# Patient Record
Sex: FEMALE | Race: WHITE | URBAN - METROPOLITAN AREA
[De-identification: names, ages, dates, MRNs, and addresses within clinical notes are randomized per-mention and may not be internally consistent; named-entity substitution may affect disease eponyms.]

---

## 2021-03-01 ENCOUNTER — OFFICE VISIT (OUTPATIENT)
Dept: FAMILY MEDICINE CLINIC | Facility: CLINIC | Age: 61
End: 2021-03-01
Payer: MEDICAID

## 2021-03-01 VITALS
TEMPERATURE: 99 F | RESPIRATION RATE: 14 BRPM | WEIGHT: 158 LBS | BODY MASS INDEX: 26.33 KG/M2 | HEART RATE: 104 BPM | SYSTOLIC BLOOD PRESSURE: 130 MMHG | OXYGEN SATURATION: 94 % | HEIGHT: 65 IN | DIASTOLIC BLOOD PRESSURE: 70 MMHG

## 2021-03-01 DIAGNOSIS — G47.01 INSOMNIA DUE TO MEDICAL CONDITION: ICD-10-CM

## 2021-03-01 DIAGNOSIS — E78.2 MIXED HYPERLIPIDEMIA: ICD-10-CM

## 2021-03-01 DIAGNOSIS — E11.21 TYPE 2 DIABETES MELLITUS WITH DIABETIC NEPHROPATHY, WITHOUT LONG-TERM CURRENT USE OF INSULIN (HCC): ICD-10-CM

## 2021-03-01 DIAGNOSIS — F41.9 ANXIETY: ICD-10-CM

## 2021-03-01 DIAGNOSIS — G89.21 CHRONIC PAIN DUE TO TRAUMA: Primary | ICD-10-CM

## 2021-03-01 DIAGNOSIS — F43.12 CHRONIC POST-TRAUMATIC STRESS DISORDER (PTSD): ICD-10-CM

## 2021-03-01 PROCEDURE — 3008F BODY MASS INDEX DOCD: CPT | Performed by: FAMILY MEDICINE

## 2021-03-01 PROCEDURE — 3078F DIAST BP <80 MM HG: CPT | Performed by: FAMILY MEDICINE

## 2021-03-01 PROCEDURE — 3075F SYST BP GE 130 - 139MM HG: CPT | Performed by: FAMILY MEDICINE

## 2021-03-01 PROCEDURE — 99205 OFFICE O/P NEW HI 60 MIN: CPT | Performed by: FAMILY MEDICINE

## 2021-03-01 RX ORDER — ZOLPIDEM TARTRATE 12.5 MG/1
12.5 TABLET, FILM COATED, EXTENDED RELEASE ORAL NIGHTLY PRN
Qty: 30 TABLET | Refills: 2 | Status: SHIPPED | OUTPATIENT
Start: 2021-03-01 | End: 2021-06-03

## 2021-03-01 RX ORDER — FENOFIBRATE 160 MG/1
160 TABLET ORAL DAILY
Qty: 90 TABLET | Refills: 1 | Status: SHIPPED | OUTPATIENT
Start: 2021-03-01 | End: 2021-11-02

## 2021-03-01 RX ORDER — MIRTAZAPINE 30 MG/1
30 TABLET, FILM COATED ORAL NIGHTLY
COMMUNITY
Start: 2021-02-17 | End: 2021-03-01

## 2021-03-01 RX ORDER — MIRTAZAPINE 30 MG/1
30 TABLET, FILM COATED ORAL NIGHTLY
Qty: 90 TABLET | Refills: 1 | Status: SHIPPED | OUTPATIENT
Start: 2021-03-01 | End: 2021-09-21

## 2021-03-01 RX ORDER — FENOFIBRATE 160 MG/1
160 TABLET ORAL DAILY
COMMUNITY
Start: 2021-02-03 | End: 2021-03-01

## 2021-03-01 RX ORDER — NALOXONE HYDROCHLORIDE 4 MG/.1ML
4 SPRAY, METERED NASAL AS NEEDED
Qty: 1 KIT | Refills: 0 | Status: SHIPPED | OUTPATIENT
Start: 2021-03-01

## 2021-03-01 RX ORDER — ALPRAZOLAM 1 MG/1
1 TABLET ORAL 3 TIMES DAILY PRN
Qty: 90 TABLET | Refills: 1 | Status: SHIPPED | OUTPATIENT
Start: 2021-03-01 | End: 2021-04-14

## 2021-03-01 RX ORDER — ZOLPIDEM TARTRATE 10 MG/1
TABLET ORAL
COMMUNITY
Start: 2021-02-05 | End: 2021-03-01

## 2021-03-01 RX ORDER — SIMVASTATIN 20 MG
20 TABLET ORAL EVERY EVENING
Qty: 90 TABLET | Refills: 1 | Status: SHIPPED | OUTPATIENT
Start: 2021-03-01 | End: 2021-10-05

## 2021-03-01 RX ORDER — OXYCODONE HYDROCHLORIDE 15 MG/1
15 TABLET ORAL EVERY 6 HOURS
COMMUNITY
Start: 2021-02-19 | End: 2021-03-24

## 2021-03-01 RX ORDER — BLOOD-GLUCOSE METER
EACH MISCELLANEOUS
COMMUNITY
Start: 2021-02-03

## 2021-03-01 RX ORDER — BLOOD SUGAR DIAGNOSTIC
STRIP MISCELLANEOUS DAILY
COMMUNITY
Start: 2021-02-03

## 2021-03-01 RX ORDER — SIMVASTATIN 20 MG
20 TABLET ORAL EVERY EVENING
COMMUNITY
Start: 2021-02-03 | End: 2021-03-01

## 2021-03-01 RX ORDER — LANCETS 33 GAUGE
1 EACH MISCELLANEOUS DAILY
COMMUNITY
Start: 2021-02-03

## 2021-03-01 RX ORDER — MORPHINE SULFATE 30 MG/1
60 TABLET, FILM COATED, EXTENDED RELEASE ORAL EVERY 12 HOURS
COMMUNITY
Start: 2021-01-21 | End: 2021-03-01

## 2021-03-01 RX ORDER — ALPRAZOLAM 1 MG/1
TABLET ORAL
COMMUNITY
Start: 2021-02-03 | End: 2021-03-01

## 2021-03-01 RX ORDER — OXYCODONE HYDROCHLORIDE 15 MG/1
15 TABLET ORAL EVERY 6 HOURS PRN
Qty: 120 TABLET | Refills: 0 | Status: SHIPPED | OUTPATIENT
Start: 2021-03-01 | End: 2021-03-11

## 2021-03-01 NOTE — PROGRESS NOTES
Chief Complaint:  Patient presents with:  Establish Care: New Pt  Pain    HPI:  This is a 64year old female patient presenting for Establish Care (New Pt) and Pain    Patient presents today with her niece. They live 2 hours away currently.  She recently mo mirtazapine and xanax. Xanax is taken TID and has for years due to PTSD and anxiety. With the transition and living situation has needed a few extra this month. Denies SI/HI. As well, due to pain and anxiety, has insomnia. Currently on ambien CR 12.5mg.  Leidy Godinez DAILY     • ONETOUCH ULTRA In Vitro Strip by Other route daily. • Lancets (ONETOUCH DELICA PLUS [id number]) Does not apply Misc 1 lancet by Finger stick route daily. • OxyCODONE HCl IR 15 MG Oral Tab Take 15 mg by mouth every 6 (six) hours.      • Ox 03/01/21  1419   BP: 130/70   Pulse: 104   Resp: 14   Temp: 98.6 °F (37 °C)   TempSrc: Temporal   SpO2: 94%   Weight: 158 lb (71.7 kg)   Height: 5' 5\" (1.651 m)     GENERAL: vitals reviewed and listed above, alert, oriented, appears well hydrated and in n consistent with history. Patient will need chronic pain management. Recommend she is seen by someone local as 2 hours will be far to drive for monthly or even every 3 month refills.  Also, given large doses of pain meds, would recommend a specialist. Yissel Ponce (1,000 mg total) by mouth 2 (two) times daily with meals.  -Labs at next visit    RTC in 3 months.     Spent 67 minutes reviewing chart and records, obtaining history, evaluating patient, discussing treatment options, counseling on above and completing docu

## 2021-03-02 ENCOUNTER — TELEPHONE (OUTPATIENT)
Dept: FAMILY MEDICINE CLINIC | Facility: CLINIC | Age: 61
End: 2021-03-02

## 2021-03-02 PROBLEM — E11.21 TYPE 2 DIABETES MELLITUS WITH DIABETIC NEPHROPATHY, WITHOUT LONG-TERM CURRENT USE OF INSULIN (HCC): Status: ACTIVE | Noted: 2021-03-02

## 2021-03-02 PROBLEM — G89.21 CHRONIC PAIN DUE TO TRAUMA: Status: ACTIVE | Noted: 2021-03-02

## 2021-03-02 PROBLEM — G47.01 INSOMNIA DUE TO MEDICAL CONDITION: Status: ACTIVE | Noted: 2021-03-02

## 2021-03-02 PROBLEM — F41.9 ANXIETY: Status: ACTIVE | Noted: 2021-03-02

## 2021-03-02 PROBLEM — F43.12 CHRONIC POST-TRAUMATIC STRESS DISORDER (PTSD): Status: ACTIVE | Noted: 2021-03-02

## 2021-03-02 PROBLEM — E78.2 MIXED HYPERLIPIDEMIA: Status: ACTIVE | Noted: 2021-03-02

## 2021-03-02 RX ORDER — ZOLPIDEM TARTRATE 10 MG/1
TABLET ORAL
COMMUNITY
Start: 2021-03-01 | End: 2021-04-14 | Stop reason: DRUGHIGH

## 2021-03-02 NOTE — TELEPHONE ENCOUNTER
Kerri Hooks from [employer] states that pt will require a Prior Authorization on pt's medication OxyCODONE HCl IR 15 MG.  Fax to follow (help desk [phone])

## 2021-03-02 NOTE — TELEPHONE ENCOUNTER
Approved today  Details of this decision are provided on the physician outcome notice which has been faxed to the number on file.

## 2021-03-03 NOTE — TELEPHONE ENCOUNTER
Received a fax from [facility] regarding coverage of Oxycodone 15mgs which is now denied  Pt must see a pain specialist, Tarcie Figueredo specialist or Neurologist to treat her conditions   Tracking Number [id number]

## 2021-03-03 NOTE — TELEPHONE ENCOUNTER
LM for patient with the following information:  Received fax from [facility] regarding coverage of Oxycodone 15mgs whis is now denied patient must see a pain specialist, Pamela Figueredo specialist or Neurologist to treat her conditions

## 2021-03-08 ENCOUNTER — TELEPHONE (OUTPATIENT)
Dept: FAMILY MEDICINE CLINIC | Facility: CLINIC | Age: 61
End: 2021-03-08

## 2021-03-08 DIAGNOSIS — G89.21 CHRONIC PAIN DUE TO TRAUMA: ICD-10-CM

## 2021-03-08 NOTE — TELEPHONE ENCOUNTER
Pt requesting a refill on oxyCODONE. Advised pt to pharmacy pt states she called them and they old her they cannot send a refill request because medication has been discontinued. Pt states she has 1 week left of medication.

## 2021-03-08 NOTE — TELEPHONE ENCOUNTER
Per [place]  oxycodone was dispensed on 3/1/21 for #120    Left message on home number for patient to call back.

## 2021-03-10 ENCOUNTER — TELEPHONE (OUTPATIENT)
Dept: FAMILY MEDICINE CLINIC | Facility: CLINIC | Age: 61
End: 2021-03-10

## 2021-03-10 DIAGNOSIS — G89.21 CHRONIC PAIN DUE TO TRAUMA: Primary | ICD-10-CM

## 2021-03-10 NOTE — TELEPHONE ENCOUNTER
Dr. Ashley Huggins, could you please finish your office visit note from 3/1/2021 for this patient. She has found a pain management doctor that will take her insurance and I need to fax over her last office visit notes. Thank you.

## 2021-03-10 NOTE — TELEPHONE ENCOUNTER
Patient is needing a referral to pain specialist, she spoke with her insurance and they had given her the name and number of Dr. Amanda Lazar, [phone]

## 2021-03-10 NOTE — TELEPHONE ENCOUNTER
Called pharmacy and on 3/1/21 she picked up 60 tablets not 120 as the pharmacy only had 60 tablets. So she will need refill sent as she only got 15 days of medication.  She will need new prescription sent as the other one was used up even though they only g

## 2021-03-11 RX ORDER — OXYCODONE HYDROCHLORIDE 15 MG/1
15 TABLET ORAL EVERY 6 HOURS PRN
Qty: 60 TABLET | Refills: 0 | Status: SHIPPED | OUTPATIENT
Start: 2021-03-11 | End: 2021-05-13 | Stop reason: ALTCHOICE

## 2021-03-11 NOTE — TELEPHONE ENCOUNTER
I sent in #60 (although I am confused why they need a new script if they gave a partial fill)    Carlos Goes, DO

## 2021-03-11 NOTE — TELEPHONE ENCOUNTER
Referral request Dr. Margot Nails M.D.  [address]    Fax number: [phone]    Diagnosis: Chronic pain due to trauma

## 2021-03-16 ENCOUNTER — TELEPHONE (OUTPATIENT)
Dept: FAMILY MEDICINE CLINIC | Facility: CLINIC | Age: 61
End: 2021-03-16

## 2021-03-16 NOTE — TELEPHONE ENCOUNTER
Patient completed medical records release form to obtain records from previous provider Zaire Mcelroy MD. Release faxed to Lu Saavedra.  Akanksha Mcelroy MD at [phone]

## 2021-03-16 NOTE — TELEPHONE ENCOUNTER
Patient completed medical records release form to obtain records from previous provider, [facility] Department.  Release faxed to [facility] at [phone]

## 2021-03-22 DIAGNOSIS — G89.21 CHRONIC PAIN DUE TO TRAUMA: Primary | ICD-10-CM

## 2021-03-22 NOTE — TELEPHONE ENCOUNTER
Patient needs her Furosemide and Mirtazapine 30 mg for post traumatic stress  refilled to [address] [place] [address]. She is out of the medication.

## 2021-03-22 NOTE — TELEPHONE ENCOUNTER
Rx for Mirtazapine was [facility]1/2021 for #90 with one refill. We do not have Furosemide listed in her current medications and I do not see it in her medication history. What does she take Furosemide for and what is sig?     LOV 3/1/2021 was

## 2021-03-24 RX ORDER — FUROSEMIDE 20 MG/1
20 TABLET ORAL DAILY
Refills: 0 | COMMUNITY
Start: 2021-03-24 | End: 2021-03-26

## 2021-03-24 NOTE — TELEPHONE ENCOUNTER
called and talked to patient and confirmed that she is taking furosemide 20 mg every am for swelling in her ankles. Entered this in her formulary.  She also said she cannot see the pain clinic until she can have her records sent from [place], she is Obi Lizarraga

## 2021-03-25 RX ORDER — OXYCODONE HYDROCHLORIDE 15 MG/1
15 TABLET ORAL EVERY 6 HOURS
Qty: 60 TABLET | Refills: 0 | Status: SHIPPED | OUTPATIENT
Start: 2021-03-25 | End: 2021-05-13 | Stop reason: ALTCHOICE

## 2021-03-25 NOTE — TELEPHONE ENCOUNTER
I sent in the script for oxycodone. Please note that she needs to get in to see the pain management specialist- I do not plan on managing her pain long term given she is complex case.  Also, according to previous notes, her insurance will not cover unless i

## 2021-03-26 NOTE — TELEPHONE ENCOUNTER
Pt is calling and [facility] said they never received the script for her Furosemide. Please send again.

## 2021-03-28 RX ORDER — FUROSEMIDE 20 MG/1
20 TABLET ORAL DAILY
Qty: 90 TABLET | Refills: 0 | Status: SHIPPED | OUTPATIENT
Start: 2021-03-28 | End: 2021-07-09

## 2021-04-14 ENCOUNTER — OFFICE VISIT (OUTPATIENT)
Dept: FAMILY MEDICINE CLINIC | Facility: CLINIC | Age: 61
End: 2021-04-14
Payer: MEDICAID

## 2021-04-14 ENCOUNTER — TELEPHONE (OUTPATIENT)
Dept: FAMILY MEDICINE CLINIC | Facility: CLINIC | Age: 61
End: 2021-04-14

## 2021-04-14 VITALS
WEIGHT: 166 LBS | OXYGEN SATURATION: 98 % | SYSTOLIC BLOOD PRESSURE: 130 MMHG | RESPIRATION RATE: 14 BRPM | DIASTOLIC BLOOD PRESSURE: 70 MMHG | HEIGHT: 65 IN | BODY MASS INDEX: 27.66 KG/M2 | TEMPERATURE: 98 F | HEART RATE: 98 BPM

## 2021-04-14 DIAGNOSIS — E11.21 TYPE 2 DIABETES MELLITUS WITH DIABETIC NEPHROPATHY, WITHOUT LONG-TERM CURRENT USE OF INSULIN (HCC): ICD-10-CM

## 2021-04-14 DIAGNOSIS — F43.12 CHRONIC POST-TRAUMATIC STRESS DISORDER (PTSD): ICD-10-CM

## 2021-04-14 DIAGNOSIS — F41.9 ANXIETY: ICD-10-CM

## 2021-04-14 DIAGNOSIS — G89.21 CHRONIC PAIN DUE TO TRAUMA: Primary | ICD-10-CM

## 2021-04-14 DIAGNOSIS — G47.01 INSOMNIA DUE TO MEDICAL CONDITION: ICD-10-CM

## 2021-04-14 DIAGNOSIS — E78.2 MIXED HYPERLIPIDEMIA: ICD-10-CM

## 2021-04-14 PROCEDURE — 3075F SYST BP GE 130 - 139MM HG: CPT | Performed by: FAMILY MEDICINE

## 2021-04-14 PROCEDURE — 3008F BODY MASS INDEX DOCD: CPT | Performed by: FAMILY MEDICINE

## 2021-04-14 PROCEDURE — 3078F DIAST BP <80 MM HG: CPT | Performed by: FAMILY MEDICINE

## 2021-04-14 PROCEDURE — 99214 OFFICE O/P EST MOD 30 MIN: CPT | Performed by: FAMILY MEDICINE

## 2021-04-14 RX ORDER — OXYCODONE HYDROCHLORIDE 15 MG/1
15 TABLET ORAL EVERY 6 HOURS PRN
Qty: 60 TABLET | Refills: 0 | Status: SHIPPED | OUTPATIENT
Start: 2021-04-14 | End: 2021-04-30

## 2021-04-14 RX ORDER — ALPRAZOLAM 1 MG/1
1 TABLET ORAL 3 TIMES DAILY PRN
Qty: 90 TABLET | Refills: 2 | Status: SHIPPED | OUTPATIENT
Start: 2021-04-14 | End: 2021-07-06

## 2021-04-14 NOTE — PROGRESS NOTES
Chief Complaint:  Patient presents with:  Pain: Follow Up on pain medication, hasnt been able to make an appt with pain speacialist until they recieve her old medical records   Sleep Problem: Insurance wont cover Zolpidem  Anxiety: Requesting refills on Al history on file.    Past Surgical History:   Procedure Laterality Date   • APPENDECTOMY     • FACIAL PROSTHESIS     • FOOT SURGERY     • HIP REPLACEMENT SURGERY     • HYSTERECTOMY        Family History   Problem Relation Age of Onset   • Diabetes Mother Oral Tab CR Take 1 tablet (12.5 mg total) by mouth nightly as needed for Sleep. 30 tablet 2   • mirtazapine 30 MG Oral Tab Take 1 tablet (30 mg total) by mouth nightly.  90 tablet 1   • Fenofibrate 160 MG Oral Tab Take 1 tablet (160 mg total) by mouth daily Insomnia due to medical condition       Mental Health    Chronic post-traumatic stress disorder (PTSD)    Relevant Medications    ALPRAZolam 1 MG Oral Tab       Other    Anxiety    Relevant Medications    ALPRAZolam 1 MG Oral Tab          Advised the jose davido

## 2021-04-14 NOTE — TELEPHONE ENCOUNTER
Received letter from [facility] a notification regarding coverage of Zolpidem ER 12.5mgs coverage was granted only temporary fill for 30 days, now PA is required

## 2021-04-15 NOTE — TELEPHONE ENCOUNTER
Received faxed response from [facility] regarding coverage of Zolpidem tartrate 12.5mgs, coverage granted effective 3/1/2021 and ends 4/14/2022  Case # [id number]

## 2021-04-26 ENCOUNTER — TELEPHONE (OUTPATIENT)
Dept: FAMILY MEDICINE CLINIC | Facility: CLINIC | Age: 61
End: 2021-04-26

## 2021-04-26 DIAGNOSIS — G89.21 CHRONIC PAIN DUE TO TRAUMA: ICD-10-CM

## 2021-04-26 NOTE — TELEPHONE ENCOUNTER
Pt is calling she said she went to the pain clinic that Dr. Tristan Kyle had referred her to and they said her situation was too complicated and to contact Dr. Tristan Kyle back. They will not see her.  Please call

## 2021-04-27 NOTE — TELEPHONE ENCOUNTER
Can we get the pain management contact information? I am not sure why they cannot prescribe her chronic pain medications that she has been stable on for a long time. I manage all her other medications. I plan to discuss with their office.     Mckenzie La,

## 2021-04-30 RX ORDER — OXYCODONE HYDROCHLORIDE 15 MG/1
15 TABLET ORAL EVERY 6 HOURS PRN
Qty: 60 TABLET | Refills: 0 | Status: SHIPPED | OUTPATIENT
Start: 2021-04-30 | End: 2021-05-13 | Stop reason: ALTCHOICE

## 2021-04-30 NOTE — TELEPHONE ENCOUNTER
I will be reaching out to the pain management specialist that she saw to discuss. I do not intend to be prescribing this long term. I would recommend that she look into alternative pain management specialists. I will provide a short term refill.   Kelley Raya

## 2021-05-01 NOTE — TELEPHONE ENCOUNTER
several pain management that takes her insurance  [facility] [address]  Dr Charla Andersen  [phone]  Dr Leon Payment [phone]  Darlene Everett [address] [facility] [address] [facility]

## 2021-05-07 NOTE — TELEPHONE ENCOUNTER
Patient called back and I went over her the names of some of the pain med doctors who take her insurance we have been calling the wrong phone number so I corrected the number in her chart

## 2021-05-10 ENCOUNTER — TELEPHONE (OUTPATIENT)
Dept: FAMILY MEDICINE CLINIC | Facility: CLINIC | Age: 61
End: 2021-05-10

## 2021-05-10 DIAGNOSIS — F43.12 PROLONGED POSTTRAUMATIC STRESS DISORDER: ICD-10-CM

## 2021-05-10 DIAGNOSIS — G89.21 CHRONIC PAIN DUE TO TRAUMA: Primary | ICD-10-CM

## 2021-05-10 NOTE — TELEPHONE ENCOUNTER
Pt states she does not need referral for [facility]. The doctor there told her that she is too complicated and refused to see her. Pt states she is still looking for a pain clinic and will call back once she finds one.

## 2021-05-10 NOTE — TELEPHONE ENCOUNTER
Pt calling with Information on who she can see per her Insurance.  [place] [address]  Ph # [phone] and fax # [phone]

## 2021-05-11 NOTE — TELEPHONE ENCOUNTER
Referral request [address] [facility] [place] [address]  [address] [facility],[address]  [facility] [place]  Phone number: [phone]  Fax number: [phone]    Office notes from Dr. Angus Delgado DO 3/1/2021  Saw a physician in her taken TID and has for years due to PTSD and anxiety. With the transition and living situation has needed a few extra this month. Denies SI/HI. As well, due to pain and anxiety, has insomnia. Currently on ambien CR 12.5mg.  This works but is not perfect at

## 2021-05-12 DIAGNOSIS — G89.21 CHRONIC PAIN DUE TO TRAUMA: ICD-10-CM

## 2021-05-12 NOTE — TELEPHONE ENCOUNTER
Pt states she will be out of OxyCODONE on 05/15/21. Advised pt to reach out to her pharmacy for the refill. Pt states she never has to call her pharmacy she just calls her our office.

## 2021-05-13 RX ORDER — OXYCODONE HYDROCHLORIDE 15 MG/1
15 TABLET ORAL EVERY 6 HOURS PRN
Qty: 60 TABLET | Refills: 0 | Status: SHIPPED | OUTPATIENT
Start: 2021-05-13 | End: 2021-05-24

## 2021-05-13 NOTE — TELEPHONE ENCOUNTER
Will refill again. Needs appt in office for next refill if not yet established with pain management. Do we know why pain management is not accepting her as a patient?     Krissy Lopez, DO

## 2021-05-13 NOTE — TELEPHONE ENCOUNTER
Called and talked to patient she is trying to get in with pain management but waiting for her records to be sent.

## 2021-05-24 RX ORDER — OXYCODONE HYDROCHLORIDE 15 MG/1
15 TABLET ORAL EVERY 6 HOURS PRN
Qty: 60 TABLET | Refills: 0 | Status: SHIPPED | OUTPATIENT
Start: 2021-05-24 | End: 2021-06-03

## 2021-05-24 NOTE — TELEPHONE ENCOUNTER
Pt just got a call from transportation and they can not bring her to her appointment tomorrow with Dr. Alejandra Davis at 11:20 am and her sister is going out of town until 6/3/21 she can come at 1:40 pm with Dr. Alejandra Davis until then she will be out of pain medication

## 2021-05-24 NOTE — TELEPHONE ENCOUNTER
Pt has no ride for tomorrow's appt. She said that she cannot do a video visit.     Future Appointments   Date Time Provider Lindsay Chang   6/3/2021  1:40 PM Kelley Menendez, DO [facility]   7/6/2021 11:00 AM Kelley Menendez, DO [facility]

## 2021-05-30 DIAGNOSIS — G47.01 INSOMNIA DUE TO MEDICAL CONDITION: ICD-10-CM

## 2021-06-01 NOTE — TELEPHONE ENCOUNTER
Refill request for:    Requested Prescriptions     Pending Prescriptions Disp Refills   • ZOLPIDEM TARTRATE ER 12.5 MG Oral Tab CR [Pharmacy Med Name: Zolpidem Tartrate ER 12.5 MG Oral Tablet Extended Release] 30 tablet 0     Sig: TAKE 1 TABLET BY MOUTH IN

## 2021-06-03 ENCOUNTER — OFFICE VISIT (OUTPATIENT)
Dept: FAMILY MEDICINE CLINIC | Facility: CLINIC | Age: 61
End: 2021-06-03
Payer: MEDICAID

## 2021-06-03 ENCOUNTER — TELEPHONE (OUTPATIENT)
Dept: FAMILY MEDICINE CLINIC | Facility: CLINIC | Age: 61
End: 2021-06-03

## 2021-06-03 VITALS
SYSTOLIC BLOOD PRESSURE: 126 MMHG | OXYGEN SATURATION: 99 % | DIASTOLIC BLOOD PRESSURE: 74 MMHG | RESPIRATION RATE: 14 BRPM | HEART RATE: 80 BPM | TEMPERATURE: 98 F | WEIGHT: 163 LBS | BODY MASS INDEX: 27.16 KG/M2 | HEIGHT: 65 IN

## 2021-06-03 DIAGNOSIS — Z00.00 ROUTINE HEALTH MAINTENANCE: ICD-10-CM

## 2021-06-03 DIAGNOSIS — G89.21 CHRONIC PAIN DUE TO TRAUMA: ICD-10-CM

## 2021-06-03 DIAGNOSIS — J44.9 CHRONIC OBSTRUCTIVE PULMONARY DISEASE, UNSPECIFIED COPD TYPE (HCC): Primary | ICD-10-CM

## 2021-06-03 DIAGNOSIS — E78.2 MIXED HYPERLIPIDEMIA: ICD-10-CM

## 2021-06-03 DIAGNOSIS — R01.1 HEART MURMUR: ICD-10-CM

## 2021-06-03 DIAGNOSIS — G47.01 INSOMNIA DUE TO MEDICAL CONDITION: ICD-10-CM

## 2021-06-03 DIAGNOSIS — R06.00 DYSPNEA ON EXERTION: ICD-10-CM

## 2021-06-03 DIAGNOSIS — E11.21 TYPE 2 DIABETES MELLITUS WITH DIABETIC NEPHROPATHY, WITHOUT LONG-TERM CURRENT USE OF INSULIN (HCC): ICD-10-CM

## 2021-06-03 DIAGNOSIS — G43.709 CHRONIC MIGRAINE WITHOUT AURA WITHOUT STATUS MIGRAINOSUS, NOT INTRACTABLE: ICD-10-CM

## 2021-06-03 PROCEDURE — 3074F SYST BP LT 130 MM HG: CPT | Performed by: FAMILY MEDICINE

## 2021-06-03 PROCEDURE — 99214 OFFICE O/P EST MOD 30 MIN: CPT | Performed by: FAMILY MEDICINE

## 2021-06-03 PROCEDURE — 3078F DIAST BP <80 MM HG: CPT | Performed by: FAMILY MEDICINE

## 2021-06-03 PROCEDURE — 3008F BODY MASS INDEX DOCD: CPT | Performed by: FAMILY MEDICINE

## 2021-06-03 RX ORDER — PROMETHAZINE HYDROCHLORIDE 25 MG/1
25 TABLET ORAL EVERY 8 HOURS PRN
Qty: 30 TABLET | Refills: 1 | Status: SHIPPED | OUTPATIENT
Start: 2021-06-03 | End: 2021-07-06

## 2021-06-03 RX ORDER — OXYCODONE HYDROCHLORIDE 15 MG/1
15 TABLET ORAL EVERY 6 HOURS PRN
Qty: 120 TABLET | Refills: 0 | Status: SHIPPED | OUTPATIENT
Start: 2021-06-03 | End: 2021-06-15

## 2021-06-03 RX ORDER — TIOTROPIUM BROMIDE 18 UG/1
18 CAPSULE ORAL; RESPIRATORY (INHALATION) DAILY
Qty: 90 CAPSULE | Refills: 3 | Status: SHIPPED | OUTPATIENT
Start: 2021-06-03 | End: 2021-11-02

## 2021-06-03 RX ORDER — ZOLMITRIPTAN 5 MG/1
5 TABLET, FILM COATED ORAL AS NEEDED
Qty: 12 TABLET | Refills: 1 | Status: SHIPPED | OUTPATIENT
Start: 2021-06-03 | End: 2021-06-11

## 2021-06-03 RX ORDER — ALBUTEROL SULFATE 90 UG/1
2 AEROSOL, METERED RESPIRATORY (INHALATION) EVERY 6 HOURS PRN
Qty: 18 G | Refills: 5 | Status: SHIPPED | OUTPATIENT
Start: 2021-06-03

## 2021-06-03 RX ORDER — PROMETHAZINE HYDROCHLORIDE 25 MG/1
25 TABLET ORAL EVERY 6 HOURS PRN
COMMUNITY
End: 2022-02-01

## 2021-06-03 RX ORDER — ZOLPIDEM TARTRATE 12.5 MG/1
12.5 TABLET, FILM COATED, EXTENDED RELEASE ORAL NIGHTLY PRN
Qty: 30 TABLET | Refills: 2 | Status: SHIPPED | OUTPATIENT
Start: 2021-06-03 | End: 2021-09-03

## 2021-06-03 NOTE — PROGRESS NOTES
Chief Complaint:  Patient presents with:  Medication Follow-Up: Follow Up Oxycodone   Forms Completion: Parking Placard   Diabetes: Has an appt with eye Dr. Austin and questioning which lab she should go to      HPI:  This is a 64year old female patient pre Review of systems performed and negative unless stated in HPI. Past medical, family and social history reviewed and listed as below. HISTORY:  History reviewed. No pertinent past medical history.    Past Surgical History:   Procedure Laterality Date daily with breakfast. 90 tablet 1   • furosemide (LASIX) 20 MG Oral Tab Take 1 tablet (20 mg total) by mouth daily.  90 tablet 0   • Blood Glucose Monitoring Suppl (ONETOUCH ULTRA 2) w/Device Does not apply Kit USE AS DIRECTED TESTING ONCE DAILY     • ONETO EXTREMITIES: warm and well perfused  PSYCH: pleasant and cooperative, flattened affect, tearful    ASSESSMENT AND PLAN:  Discussed the following assessment   Problem List Items Addressed This Visit        Cardiovascular    Mixed hyperlipidemia    Relevan (90 Base) MCG/ACT Inhalation Aero Soln; Inhale 2 puffs into the lungs every 6 (six) hours as needed for Wheezing (cough). -Encouraged to quit smoking. Patient not interested at this time. -     Will obtain COMPLETE PFT;  Future  -    STart Tiotropium Brom 25-HYDROXY    Type 2 diabetes mellitus with diabetic nephropathy, without long-term current use of insulin ([facility] [address]  Update labs. To send us eye exam info.    -     HEMOGLOBIN A1C  -     MICROALB/CREAT RATIO, RANDOM URINE    Mixed hyperlipidemia  -    recheck

## 2021-06-03 NOTE — TELEPHONE ENCOUNTER
Pharmacy faxed over prior-authorization for Zolpidem Tartrate ER (AMBIEN CR) 12.5 MG Oral Tab CR     Key  [id number]  Paperwork in Pat's office

## 2021-06-05 RX ORDER — ZOLPIDEM TARTRATE 12.5 MG/1
TABLET, FILM COATED, EXTENDED RELEASE ORAL
Qty: 30 TABLET | Refills: 0 | OUTPATIENT
Start: 2021-06-05

## 2021-06-07 ENCOUNTER — TELEPHONE (OUTPATIENT)
Dept: FAMILY MEDICINE CLINIC | Facility: CLINIC | Age: 61
End: 2021-06-07

## 2021-06-07 NOTE — TELEPHONE ENCOUNTER
Pt stating she is wanting her Zolmitriptan 5 mg for her migraines. She said the pharmacy is not refilling it. She said she can't get the remainder from the pharmacy at [facility] in [facility]. Please call to verify.  Pt is completely out of it and has been f

## 2021-06-07 NOTE — TELEPHONE ENCOUNTER
Pt said they refilled the Zolpidem it is the Zolmitriptan 5 mg that his needed. I requested it in another encounter.

## 2021-06-07 NOTE — TELEPHONE ENCOUNTER
I did not received the paperwork on this PA  And I do not know the pharmacy  Previously completed PA completed on 04/14/2021    Received faxed response from [facility] regarding coverage of Zolpidem tartrate 12.5mgs, coverage granted effective

## 2021-06-10 NOTE — TELEPHONE ENCOUNTER
Received faxed response to coverage of Zolmitriptan 5mgs - coverage denied  Must have tried and documented failure of at least 4 the following meds    Ibuprofen 400, 600, 800mg tabs  Naproxen tabs  Rizatriptan tabs or ODT  Sumatriptan tabs, nasal spray, au

## 2021-06-10 NOTE — TELEPHONE ENCOUNTER
Let's change to rizatriptan if patient is ok with this. 10mg, #12, TAke 1 tab po prn migraine, repeat in 2 hours if needed. No more than 2 doses in 24 hours.     Mikey Kaplan, DO

## 2021-06-11 RX ORDER — RIZATRIPTAN BENZOATE 10 MG/1
10 TABLET ORAL AS NEEDED
Qty: 12 TABLET | Refills: 1 | Status: SHIPPED | OUTPATIENT
Start: 2021-06-11 | End: 2021-12-29

## 2021-06-14 LAB — AMB EXT CREATININE: 1.52 MG/DL

## 2021-06-15 ENCOUNTER — TELEPHONE (OUTPATIENT)
Dept: FAMILY MEDICINE CLINIC | Facility: CLINIC | Age: 61
End: 2021-06-15

## 2021-06-15 DIAGNOSIS — G89.21 CHRONIC PAIN DUE TO TRAUMA: ICD-10-CM

## 2021-06-15 NOTE — TELEPHONE ENCOUNTER
PA request for Oxycodone. Questions answered by phone with Clinical Review dept. Chart notes requested. Faxed OV note from 3/1/21.

## 2021-06-15 NOTE — TELEPHONE ENCOUNTER
Patient requesting balance of script (#59).      Routed to Dr. Oneyda Soliz for review    Would like sent to [address] [facility] in [facility]

## 2021-06-15 NOTE — TELEPHONE ENCOUNTER
Received fax from [address] with patient's test results, placed in Dr. Maria Elena Krishnamurthy in box

## 2021-06-15 NOTE — TELEPHONE ENCOUNTER
Called pharmacy - they stated they only had 61 tabs in stock and patient opted to take what they had instead of waiting for remaining quantity to become available. Left message on home number for patient to call back.

## 2021-06-15 NOTE — TELEPHONE ENCOUNTER
Pt states she got her medication and the pharmacy only gave her 61 tablets out of 120. Pt states she was told that the the 120 tablets was denied by Dr. Parvez Boss. Pt states she will be out of medication Saturday 06/19/21.

## 2021-06-16 RX ORDER — OXYCODONE HYDROCHLORIDE 15 MG/1
15 TABLET ORAL EVERY 6 HOURS PRN
Qty: 59 TABLET | Refills: 0 | Status: SHIPPED | OUTPATIENT
Start: 2021-06-16 | End: 2021-07-01

## 2021-06-16 NOTE — TELEPHONE ENCOUNTER
Received faxed response from [address] # [id number]  Oxycodone 15mig tabs coverage partial approval  3/16/21 to 7/16/2021    In order to get further refill approved the must be documentation that the benzodiazepine h

## 2021-06-24 ENCOUNTER — TELEPHONE (OUTPATIENT)
Dept: FAMILY MEDICINE CLINIC | Facility: CLINIC | Age: 61
End: 2021-06-24

## 2021-06-24 NOTE — TELEPHONE ENCOUNTER
Patient completed medical records request form to obtain records from previous provider Suzanne Goncalves MD. Release faxed to Suzanne Goncalves MD at [phone].  This is the 2nd request being faxed as we never received records after 1st request that was sent

## 2021-06-29 ENCOUNTER — TELEPHONE (OUTPATIENT)
Dept: FAMILY MEDICINE CLINIC | Facility: CLINIC | Age: 61
End: 2021-06-29

## 2021-06-29 DIAGNOSIS — G89.21 CHRONIC PAIN DUE TO TRAUMA: ICD-10-CM

## 2021-06-29 NOTE — TELEPHONE ENCOUNTER
Pt requesting a referral for [facility] to take test for her lungs.  Pt states she was told that she her test results was received from [facility] on 06/15/21which is impossible because she has never been there before and if we received any

## 2021-06-30 DIAGNOSIS — G89.21 CHRONIC PAIN DUE TO TRAUMA: ICD-10-CM

## 2021-06-30 RX ORDER — OXYCODONE HYDROCHLORIDE 15 MG/1
15 TABLET ORAL EVERY 6 HOURS PRN
Qty: 59 TABLET | Refills: 0 | Status: CANCELLED | OUTPATIENT
Start: 2021-06-30

## 2021-06-30 NOTE — TELEPHONE ENCOUNTER
Dr. Alejandra Davis, could you please give us some insight into this situation so we can call the pt back and help her? This is very confusing.

## 2021-06-30 NOTE — TELEPHONE ENCOUNTER
Patient requesting refill on OxyContin IR 15 mg    Patient states she has Oxycodone has 15 pills left. But these would only last her until Saturday.        LOV 6/03/21 [place]    oxycontin IR 15 mg, one tab every 6 hrs PRN pain , 6/16/21 #59 + Gustavo Bautista

## 2021-06-30 NOTE — TELEPHONE ENCOUNTER
Medical clinic in [place], [facility] [address], [address] in: [facility]  Address: [address], [place], [address] [facility]  Hours:   Open ? Closes 5PM  Phone: [phone]    Fax # [phone]    Pulmonary Dept.

## 2021-06-30 NOTE — TELEPHONE ENCOUNTER
Pt requesting refill of Oxycodone. States she has not been able to see pain specialist yet as she is awaiting for medical records from previous provider.

## 2021-07-01 RX ORDER — OXYCODONE HYDROCHLORIDE 15 MG/1
15 TABLET ORAL EVERY 6 HOURS PRN
Qty: 120 TABLET | Refills: 0 | Status: SHIPPED | OUTPATIENT
Start: 2021-07-01 | End: 2021-08-02

## 2021-07-01 NOTE — TELEPHONE ENCOUNTER
PAtient is to have a TTE (echo) and PFTs. She wanted to do them locally so I think she was going to take the order to her local medical center to have completed and they would send us the results. We can fax the order to them if that is what is needed.

## 2021-07-01 NOTE — TELEPHONE ENCOUNTER
(No rush)    Can I have the name and clinic info of where she was seen in [place] again?     Thanks,  Nahum

## 2021-07-01 NOTE — TELEPHONE ENCOUNTER
Orders faxed to [address]. Patient notified. She states pain management won't see her because they haven't received her records from [place] yet. She requested the records last month.      Patient states she is almost out of Oxycodone and nee

## 2021-07-02 DIAGNOSIS — F41.9 ANXIETY: ICD-10-CM

## 2021-07-02 RX ORDER — ALPRAZOLAM 1 MG/1
1 TABLET ORAL 3 TIMES DAILY PRN
Qty: 90 TABLET | Refills: 2 | OUTPATIENT
Start: 2021-07-02

## 2021-07-02 NOTE — TELEPHONE ENCOUNTER
Refill request for:    Requested Prescriptions     Pending Prescriptions Disp Refills   • ALPRAZolam 1 MG Oral Tab 90 tablet 2     Sig: Take 1 tablet (1 mg total) by mouth 3 (three) times daily as needed for Anxiety.         Last Prescribed Quantity Refills

## 2021-07-02 NOTE — TELEPHONE ENCOUNTER
FYI- I called and left message with medical records stating that it was very important we/pain management received these records asap.   Earl Benavidez DO

## 2021-07-06 ENCOUNTER — TELEMEDICINE (OUTPATIENT)
Dept: FAMILY MEDICINE CLINIC | Facility: CLINIC | Age: 61
End: 2021-07-06

## 2021-07-06 DIAGNOSIS — E11.21 TYPE 2 DIABETES MELLITUS WITH DIABETIC NEPHROPATHY, WITHOUT LONG-TERM CURRENT USE OF INSULIN (HCC): ICD-10-CM

## 2021-07-06 DIAGNOSIS — J44.9 CHRONIC OBSTRUCTIVE PULMONARY DISEASE, UNSPECIFIED COPD TYPE (HCC): ICD-10-CM

## 2021-07-06 DIAGNOSIS — G89.21 CHRONIC PAIN DUE TO TRAUMA: Primary | ICD-10-CM

## 2021-07-06 DIAGNOSIS — R01.1 HEART MURMUR: ICD-10-CM

## 2021-07-06 DIAGNOSIS — F41.9 ANXIETY: ICD-10-CM

## 2021-07-06 DIAGNOSIS — E78.2 MIXED HYPERLIPIDEMIA: ICD-10-CM

## 2021-07-06 DIAGNOSIS — R06.00 DYSPNEA ON EXERTION: ICD-10-CM

## 2021-07-06 DIAGNOSIS — G43.709 CHRONIC MIGRAINE WITHOUT AURA WITHOUT STATUS MIGRAINOSUS, NOT INTRACTABLE: ICD-10-CM

## 2021-07-06 DIAGNOSIS — F43.12 CHRONIC POST-TRAUMATIC STRESS DISORDER (PTSD): ICD-10-CM

## 2021-07-06 PROBLEM — R06.09 DYSPNEA ON EXERTION: Status: ACTIVE | Noted: 2021-07-06

## 2021-07-06 PROCEDURE — 99214 OFFICE O/P EST MOD 30 MIN: CPT | Performed by: FAMILY MEDICINE

## 2021-07-06 RX ORDER — ALPRAZOLAM 1 MG/1
1 TABLET ORAL 3 TIMES DAILY PRN
Qty: 90 TABLET | Refills: 2 | Status: SHIPPED | OUTPATIENT
Start: 2021-08-01 | End: 2021-10-26

## 2021-07-06 NOTE — PROGRESS NOTES
VIDEO VISIT  This visit is conducted using Telemedicine with live, interactive video and audio. Patient has been referred to the [facility] website at [email or web address] to review the yearly Consent to Treat document.     Patient understands and accepts Microalbumin : due               On statin yes, ACE-i no, ASA no               Current therapy: metformin 1000mg once daily  Osteoporosis: Not currently taking medications. OA: Pain management as above. HLD: On simvastatin and fenofibrate.     [facility] Main needed for Migraine (repeat in 2 hours if not better MAX 2 in 24 hours. ). 12 tablet 1   • Promethazine HCl 25 MG Oral Tab Take 25 mg by mouth every 6 (six) hours as needed for Nausea.      • Zolpidem Tartrate ER (AMBIEN CR) 12.5 MG Oral Tab CR Take 1 tablet takes PO ok  Nsaids                  HIVES, SWELLING  Penicillins             HIVES  Tetracycline            HIVES, NAUSEA AND VOMITING  Adhesive Tape           OTHER (SEE COMMENTS)    Comment:Contact Dermatitis, Paper Tape and Coban okay  Fosamax Aura [facility])  To obtain PFTs. Encouraged starting spiriva.  Cont albuterol prn    Heart murmur  To obtain TTE    Dyspnea on exertion  Work up as above    Chronic migraine without aura without status migrainosus, not intractable  Cont meds as needed    Mixed hyperl

## 2021-07-09 DIAGNOSIS — R06.00 DYSPNEA ON EXERTION: ICD-10-CM

## 2021-07-09 DIAGNOSIS — J44.9 CHRONIC OBSTRUCTIVE PULMONARY DISEASE, UNSPECIFIED COPD TYPE (HCC): Primary | ICD-10-CM

## 2021-07-09 RX ORDER — FUROSEMIDE 20 MG/1
20 TABLET ORAL DAILY
Qty: 90 TABLET | Refills: 1 | Status: SHIPPED | OUTPATIENT
Start: 2021-07-09 | End: 2021-12-28

## 2021-07-09 NOTE — TELEPHONE ENCOUNTER
Pt requesting refill on Furosemide. Down to two pills.  Please send refill to [address] [facility] in [facility]

## 2021-07-21 ENCOUNTER — TELEPHONE (OUTPATIENT)
Dept: FAMILY MEDICINE CLINIC | Facility: CLINIC | Age: 61
End: 2021-07-21

## 2021-07-21 DIAGNOSIS — R06.00 DYSPNEA ON EXERTION: Primary | ICD-10-CM

## 2021-07-21 DIAGNOSIS — J44.9 CHRONIC OBSTRUCTIVE PULMONARY DISEASE, UNSPECIFIED COPD TYPE (HCC): ICD-10-CM

## 2021-07-21 NOTE — TELEPHONE ENCOUNTER
Dr. Justyna Chiang, patient was a little hard to understand. I spoke also with patient's sister. Looks like she needs pulmonary and pain management. They live in [place].    Do you want me to try to find specialists near them that take the [facility] community in

## 2021-07-22 NOTE — TELEPHONE ENCOUNTER
Dr. Bhupinder Romero I was able to find a pulmonologist in [place], [place] that takes the [facility]. This would be about 20 minutes from the patient's home. He is asking however that the patient have a chest x-ray and a PFT prior to seeing him.   I notified

## 2021-07-22 NOTE — TELEPHONE ENCOUNTER
Yes, if we have those resources available. We have directed her to a pain management specialist in the past and per patient, they were reviewing her records.      Curtis Woodall, DO

## 2021-07-23 NOTE — TELEPHONE ENCOUNTER
Patient notified we have faxed over referral to Dr. Radha Shabazz M.D. in [place] phone number [phone] and fax number [phone].  We have also faxed orders for PFT and chest xray to [facility] at phone number [phone] and fax number [phone]

## 2021-07-23 NOTE — TELEPHONE ENCOUNTER
Referral request Dr. Wang Pruett M.D.,Pulmonology    Phone number: [phone]  Fax number: [phone]    Office visit notes from Dr. Andrew Gibbs,  6/3/21  Yannick was seen today for medication follow-up, forms completion and diabetes.     Lilly Promethazine HCl 25 MG Oral Tab; Take 1 tablet (25 mg total) by mouth every 8 (eight) hours as needed for Nausea. -     Zolmitriptan 5 MG Oral Tab;  Take 1 tablet (5 mg total) by mouth as needed for Migraine (no more than 2 tabs in 24 hours).     Routine h

## 2021-07-29 DIAGNOSIS — G89.21 CHRONIC PAIN DUE TO TRAUMA: ICD-10-CM

## 2021-07-29 NOTE — TELEPHONE ENCOUNTER
Echo results overall good. Grade 2 (mild) diastolic dysfunction. Mild regurg of mitral and tricuspid valve. Normal EF. No evidence of heart failure. Please let me know if you have any questions.   Rosalino Figueroa,  7/29/2021 5:33 PM

## 2021-07-30 ENCOUNTER — TELEPHONE (OUTPATIENT)
Dept: FAMILY MEDICINE CLINIC | Facility: CLINIC | Age: 61
End: 2021-07-30

## 2021-07-30 DIAGNOSIS — G89.21 CHRONIC PAIN DUE TO TRAUMA: ICD-10-CM

## 2021-07-30 RX ORDER — OXYCODONE HYDROCHLORIDE 15 MG/1
15 TABLET ORAL EVERY 6 HOURS PRN
Qty: 120 TABLET | Refills: 0 | Status: CANCELLED | OUTPATIENT
Start: 2021-07-30

## 2021-07-30 NOTE — TELEPHONE ENCOUNTER
Notified Yannick of Echo results. Armidagorge was pleased. She mentioned she has not yet gotten into the pain clinic. She is still waiting for records from [facility]. She will be out of her pain medication, Oxycodone 15mg on Monday.   She is taking 4 ta

## 2021-07-30 NOTE — TELEPHONE ENCOUNTER
Pt believes she received a call from our office. Pt stated the caller ID had our office number. Pt believes it might be for test results.

## 2021-08-02 RX ORDER — OXYCODONE HYDROCHLORIDE 15 MG/1
15 TABLET ORAL EVERY 6 HOURS PRN
Qty: 120 TABLET | Refills: 0 | Status: SHIPPED | OUTPATIENT
Start: 2021-08-02 | End: 2021-09-03

## 2021-08-04 ENCOUNTER — TELEPHONE (OUTPATIENT)
Dept: FAMILY MEDICINE CLINIC | Facility: CLINIC | Age: 61
End: 2021-08-04

## 2021-08-10 ENCOUNTER — TELEPHONE (OUTPATIENT)
Dept: FAMILY MEDICINE CLINIC | Facility: CLINIC | Age: 61
End: 2021-08-10

## 2021-08-10 NOTE — TELEPHONE ENCOUNTER
Received blood test results from [facility].  Placed in Dr Tae Taylor

## 2021-08-11 ENCOUNTER — TELEPHONE (OUTPATIENT)
Dept: FAMILY MEDICINE CLINIC | Facility: CLINIC | Age: 61
End: 2021-08-11

## 2021-08-11 NOTE — TELEPHONE ENCOUNTER
Labs received. A1c 6.7% which is controlled. Cr slightly elevated at 1.52. This may be her baseline. Otherwise labs look good. We will repeat labs in 6 months. Please let me know if you have any questions.   Shivam Duffy,  8/11/2021 1:26 PM

## 2021-08-30 ENCOUNTER — TELEPHONE (OUTPATIENT)
Dept: FAMILY MEDICINE CLINIC | Facility: CLINIC | Age: 61
End: 2021-08-30

## 2021-08-30 DIAGNOSIS — G89.21 CHRONIC PAIN DUE TO TRAUMA: ICD-10-CM

## 2021-08-30 DIAGNOSIS — G47.01 INSOMNIA DUE TO MEDICAL CONDITION: ICD-10-CM

## 2021-08-30 NOTE — TELEPHONE ENCOUNTER
Pt requesting refill on oxyCODONE / Zolpidem  Pt states she has enough medication to last until 09/01/21.         [address], [address] [place] [phone], [phone]

## 2021-08-30 NOTE — TELEPHONE ENCOUNTER
Refill request for:    Requested Prescriptions     Pending Prescriptions Disp Refills   • oxyCODONE 15 MG Oral Tab 120 tablet 0     Sig: Take 1 tablet (15 mg total) by mouth every 6 (six) hours as needed for Pain.    • Zolpidem Tartrate ER (AMBIEN CR) 12.5

## 2021-09-01 NOTE — TELEPHONE ENCOUNTER
Pt is scheduled for video visit with Philip Kirkpatrick on 9/3/21 for med refill. States she is still not able to get into the pain clinic as she has not received her medical records from her previous pcp.

## 2021-09-03 ENCOUNTER — TELEMEDICINE (OUTPATIENT)
Dept: FAMILY MEDICINE CLINIC | Facility: CLINIC | Age: 61
End: 2021-09-03

## 2021-09-03 DIAGNOSIS — G89.21 CHRONIC PAIN DUE TO TRAUMA: ICD-10-CM

## 2021-09-03 DIAGNOSIS — G47.01 INSOMNIA DUE TO MEDICAL CONDITION: ICD-10-CM

## 2021-09-03 PROCEDURE — 99213 OFFICE O/P EST LOW 20 MIN: CPT | Performed by: PHYSICIAN ASSISTANT

## 2021-09-03 RX ORDER — OXYCODONE HYDROCHLORIDE 15 MG/1
15 TABLET ORAL EVERY 6 HOURS PRN
Qty: 120 TABLET | Refills: 0 | Status: SHIPPED | OUTPATIENT
Start: 2021-09-03 | End: 2021-09-28

## 2021-09-03 RX ORDER — OXYCODONE HYDROCHLORIDE 15 MG/1
15 TABLET ORAL EVERY 6 HOURS PRN
Qty: 120 TABLET | Refills: 0 | OUTPATIENT
Start: 2021-09-03

## 2021-09-03 RX ORDER — ZOLPIDEM TARTRATE 12.5 MG/1
12.5 TABLET, FILM COATED, EXTENDED RELEASE ORAL NIGHTLY PRN
Qty: 30 TABLET | Refills: 2 | Status: SHIPPED | OUTPATIENT
Start: 2021-09-03 | End: 2021-11-02

## 2021-09-03 RX ORDER — ZOLPIDEM TARTRATE 12.5 MG/1
12.5 TABLET, FILM COATED, EXTENDED RELEASE ORAL NIGHTLY PRN
Qty: 30 TABLET | Refills: 2 | OUTPATIENT
Start: 2021-09-03

## 2021-09-03 NOTE — TELEPHONE ENCOUNTER
oxyCODONE 15 MG Oral Tab          The original prescription was reordered on 9/3/2021 by Suhas [place]. Renewing this prescription may not be appropriate.      Possible duplicate: Hover to review recent actions on this medication    Sig: Take 1 tablet

## 2021-09-08 NOTE — PROGRESS NOTES
Telemedicine Visit     Virtual Video Check-In    [address] verbally consents to Video Check-In service on 9/8/21.  [address] understands and accepts financial responsibility for any deductible, co-insurance and/or co-pays assoc Rizatriptan Benzoate 10 MG Oral Tab Take 1 tablet (10 mg total) by mouth as needed for Migraine (repeat in 2 hours if not better MAX 2 in 24 hours. ).  12 tablet 1   • Promethazine HCl 25 MG Oral Tab Take 25 mg by mouth every 6 (six) hours as needed for Naus OTHER (SEE COMMENTS)    Comment:Contact Dermatitis, Paper Tape and Coban okay  Fosamax [Alendronic*    OTHER (SEE COMMENTS)    Comment:Bleeding ulcers    Physical exam  Physical Exam  Constitutional:       Appearance: Normal appearance.    Pulmonary: a substitute for in person examination or emergency care. Patient advised to go to ER or call 911 for worsening symptoms or acute distress.    GERARDO Manzo

## 2021-09-14 LAB — VITAMIN D, 25-HYDROXY: 54.55 NG/ML

## 2021-09-21 DIAGNOSIS — F43.12 CHRONIC POST-TRAUMATIC STRESS DISORDER (PTSD): ICD-10-CM

## 2021-09-21 DIAGNOSIS — F41.9 ANXIETY: ICD-10-CM

## 2021-09-22 NOTE — TELEPHONE ENCOUNTER
Patient called to update her pharmacy in her chart and check status of refill. Patient will be out of medication tonight. Would like to know if refill can be sent.  Pharmacy would not transfer to new location

## 2021-09-24 RX ORDER — MIRTAZAPINE 30 MG/1
30 TABLET, FILM COATED ORAL NIGHTLY
Qty: 90 TABLET | Refills: 0 | Status: SHIPPED | OUTPATIENT
Start: 2021-09-24 | End: 2021-12-16

## 2021-09-27 DIAGNOSIS — G89.21 CHRONIC PAIN DUE TO TRAUMA: ICD-10-CM

## 2021-09-27 NOTE — TELEPHONE ENCOUNTER
LOV 9/3/21- televisit and oxycodone was refilled for 1 month and advised to get additional refills from Dr. Lula Mclean.   Please advised on refill request

## 2021-09-27 NOTE — TELEPHONE ENCOUNTER
Pt is requesting her Oxicodone 15 mg. She said she did a telemedicine visit with Faith Nyhan on 9/3/21.  Please send to [facility] in [place]

## 2021-09-28 RX ORDER — OXYCODONE HYDROCHLORIDE 15 MG/1
15 TABLET ORAL EVERY 6 HOURS PRN
Qty: 120 TABLET | Refills: 0 | Status: SHIPPED | OUTPATIENT
Start: 2021-10-02 | End: 2021-10-04

## 2021-10-04 NOTE — TELEPHONE ENCOUNTER
Pt calling for refill request on qty 40 of oxycodone. States the pharmacy did not have enough to fill qty 120 and was only given 80. Needs provider to send script for remaining #40 to [facility] in [place]. Pharmacy should be getting shipment today.

## 2021-10-05 ENCOUNTER — TELEPHONE (OUTPATIENT)
Dept: FAMILY MEDICINE CLINIC | Facility: CLINIC | Age: 61
End: 2021-10-05

## 2021-10-05 DIAGNOSIS — E78.2 MIXED HYPERLIPIDEMIA: ICD-10-CM

## 2021-10-05 RX ORDER — OXYCODONE HYDROCHLORIDE 15 MG/1
15 TABLET ORAL EVERY 6 HOURS PRN
Qty: 40 TABLET | Refills: 0 | Status: SHIPPED | OUTPATIENT
Start: 2021-10-05 | End: 2021-11-02

## 2021-10-05 RX ORDER — SIMVASTATIN 20 MG
20 TABLET ORAL EVERY EVENING
Qty: 90 TABLET | Refills: 1 | Status: SHIPPED | OUTPATIENT
Start: 2021-10-05

## 2021-10-05 NOTE — TELEPHONE ENCOUNTER
Pt would like to have her   simvastatin 20 MG Oral Tab refilled to her [facility] in [place] where she moved to.

## 2021-10-05 NOTE — TELEPHONE ENCOUNTER
Pharmacy confirmed that #80 was dispensed and a new script is needed for the balance of #40.     Routed to Dr. Laurent Field

## 2021-10-22 ENCOUNTER — TELEPHONE (OUTPATIENT)
Dept: FAMILY MEDICINE CLINIC | Facility: CLINIC | Age: 61
End: 2021-10-22

## 2021-10-22 DIAGNOSIS — E78.2 MIXED HYPERLIPIDEMIA: ICD-10-CM

## 2021-10-22 DIAGNOSIS — F41.9 ANXIETY: ICD-10-CM

## 2021-10-22 RX ORDER — FENOFIBRATE 160 MG/1
160 TABLET ORAL DAILY
Qty: 90 TABLET | Refills: 1 | Status: CANCELLED | OUTPATIENT
Start: 2021-10-22

## 2021-10-22 NOTE — TELEPHONE ENCOUNTER
Requested Prescriptions     Pending Prescriptions Disp Refills   • Fenofibrate 160 MG Oral Tab 90 tablet 1     Sig: Take 1 tablet (160 mg total) by mouth daily.    • ALPRAZolam 1 MG Oral Tab 90 tablet 2     Sig: Take 1 tablet (1 mg total) by mouth 3 (three)

## 2021-10-25 DIAGNOSIS — F41.9 ANXIETY: ICD-10-CM

## 2021-10-25 NOTE — TELEPHONE ENCOUNTER
Refill request for:    Requested Prescriptions     Pending Prescriptions Disp Refills   • ALPRAZOLAM 1 MG Oral Tab [Pharmacy Med Name: ALPRAZolam 1 MG Oral Tablet] 90 tablet 0     Sig: TAKE 1 TABLET BY MOUTH THREE TIMES DAILY AS NEEDED FOR ANXIETY        L

## 2021-10-26 RX ORDER — ALPRAZOLAM 1 MG/1
1 TABLET ORAL 3 TIMES DAILY PRN
Qty: 90 TABLET | Refills: 2 | Status: SHIPPED | OUTPATIENT
Start: 2021-10-26 | End: 2022-01-18

## 2021-11-02 ENCOUNTER — OFFICE VISIT (OUTPATIENT)
Dept: FAMILY MEDICINE CLINIC | Facility: CLINIC | Age: 61
End: 2021-11-02
Payer: MEDICAID

## 2021-11-02 ENCOUNTER — TELEPHONE (OUTPATIENT)
Dept: FAMILY MEDICINE CLINIC | Facility: CLINIC | Age: 61
End: 2021-11-02

## 2021-11-02 VITALS
SYSTOLIC BLOOD PRESSURE: 130 MMHG | RESPIRATION RATE: 16 BRPM | TEMPERATURE: 97 F | DIASTOLIC BLOOD PRESSURE: 74 MMHG | HEIGHT: 65 IN | HEART RATE: 92 BPM | WEIGHT: 160.63 LBS | BODY MASS INDEX: 26.76 KG/M2

## 2021-11-02 DIAGNOSIS — J44.9 CHRONIC OBSTRUCTIVE PULMONARY DISEASE, UNSPECIFIED COPD TYPE (HCC): ICD-10-CM

## 2021-11-02 DIAGNOSIS — G89.21 CHRONIC PAIN DUE TO TRAUMA: ICD-10-CM

## 2021-11-02 DIAGNOSIS — E11.21 TYPE 2 DIABETES MELLITUS WITH DIABETIC NEPHROPATHY, WITHOUT LONG-TERM CURRENT USE OF INSULIN (HCC): ICD-10-CM

## 2021-11-02 DIAGNOSIS — F43.12 CHRONIC POST-TRAUMATIC STRESS DISORDER (PTSD): ICD-10-CM

## 2021-11-02 DIAGNOSIS — E78.2 MIXED HYPERLIPIDEMIA: ICD-10-CM

## 2021-11-02 DIAGNOSIS — R06.00 DYSPNEA ON EXERTION: ICD-10-CM

## 2021-11-02 DIAGNOSIS — G47.01 INSOMNIA DUE TO MEDICAL CONDITION: ICD-10-CM

## 2021-11-02 DIAGNOSIS — F11.20 OPIOID DEPENDENCE, UNCOMPLICATED (HCC): ICD-10-CM

## 2021-11-02 DIAGNOSIS — Z00.00 ROUTINE HEALTH MAINTENANCE: ICD-10-CM

## 2021-11-02 DIAGNOSIS — G24.5 EYE TWITCH: ICD-10-CM

## 2021-11-02 DIAGNOSIS — F13.20 SEDATIVE, HYPNOTIC OR ANXIOLYTIC DEPENDENCE, UNCOMPLICATED (HCC): ICD-10-CM

## 2021-11-02 DIAGNOSIS — N18.32 STAGE 3B CHRONIC KIDNEY DISEASE (HCC): ICD-10-CM

## 2021-11-02 DIAGNOSIS — E11.21 TYPE 2 DIABETES MELLITUS WITH DIABETIC NEPHROPATHY, WITHOUT LONG-TERM CURRENT USE OF INSULIN (HCC): Primary | ICD-10-CM

## 2021-11-02 PROCEDURE — 3075F SYST BP GE 130 - 139MM HG: CPT | Performed by: FAMILY MEDICINE

## 2021-11-02 PROCEDURE — 99214 OFFICE O/P EST MOD 30 MIN: CPT | Performed by: FAMILY MEDICINE

## 2021-11-02 PROCEDURE — 3008F BODY MASS INDEX DOCD: CPT | Performed by: FAMILY MEDICINE

## 2021-11-02 PROCEDURE — 3078F DIAST BP <80 MM HG: CPT | Performed by: FAMILY MEDICINE

## 2021-11-02 RX ORDER — ZOLPIDEM TARTRATE 12.5 MG/1
12.5 TABLET, FILM COATED, EXTENDED RELEASE ORAL NIGHTLY PRN
Qty: 30 TABLET | Refills: 2 | Status: SHIPPED | OUTPATIENT
Start: 2021-11-02 | End: 2022-01-27

## 2021-11-02 RX ORDER — OXYCODONE HYDROCHLORIDE 15 MG/1
15 TABLET ORAL EVERY 6 HOURS PRN
Qty: 120 TABLET | Refills: 0 | Status: SHIPPED | OUTPATIENT
Start: 2021-11-02 | End: 2021-11-04

## 2021-11-02 RX ORDER — ALPRAZOLAM 1 MG/1
1 TABLET ORAL 3 TIMES DAILY PRN
Qty: 90 TABLET | Refills: 2 | OUTPATIENT
Start: 2021-11-02

## 2021-11-02 RX ORDER — FENOFIBRATE 160 MG/1
160 TABLET ORAL DAILY
Qty: 90 TABLET | Refills: 1 | Status: SHIPPED | OUTPATIENT
Start: 2021-11-02

## 2021-11-02 NOTE — TELEPHONE ENCOUNTER
Called Dr Estefani Huffman office [facility] with medical records requesting that they send most recent visits medication records.  Called pateint LMOM to call back to discuss increasing metformen

## 2021-11-02 NOTE — PROGRESS NOTES
Chief Complaint:  Patient presents with:  Fall: Tripped and fell 1 month ago landing on R knee. Iced and rested R knee. Bruising and swelling has gone down.   Medication Request: Fenofibrate and Oxycodone  Immunization/Injection: Declined flu vaccine due to sweating, fatigue, blurry vision, shakiness               Creatinine: 1.54 (GFR 37)               Complications: none               Foot exam: due               Eye exam: due- has scheduled               Microalbumin : due               On statin yes, ACE- Medications   Medication Sig Dispense Refill   • oxyCODONE 15 MG Oral Tab Take 1 tablet (15 mg total) by mouth every 6 (six) hours as needed for Pain. 120 tablet 0   • Fenofibrate 160 MG Oral Tab Take 1 tablet (160 mg total) by mouth daily.  90 tablet 1   • of             throat  Erythromycin            HIVES, NAUSEA AND VOMITING  Methadone               HIVES, CONFUSION, DIZZINESS  Morphine                HIVES, CONFUSION, DIZZINESS    Comment:Hallucinate/rash- only IV version takes PO ok  Nsaids or anxiolytic dependence, uncomplicated (HCC)       Genitourinary    Stage 3b chronic kidney disease ([facility] [address]      Other Visit Diagnoses     Routine health maintenance        Relevant Orders    CBC WITH DIFFERENTIAL WITH PLATELET    COMP METABOLIC PANEL (14) (PTSD)  Continue mirtazapine, alprazolam prn. Eye twitch  Discussed may b stress driven but given her hx would consider seeing neurology. Pt declines. Will monitor.      Stage 3b chronic kidney disease ([facility] [address]  Repeat [facility]    RTC in 3 months  Billy Zaragoza

## 2021-11-02 NOTE — TELEPHONE ENCOUNTER
A few things needed for this patient:    1. Can we please contact her previous physician's office and request records again? Dr. Claudean Cruz, [place]  Phone: [phone]    2.  We should change her metformin to 500mg BID

## 2021-11-02 NOTE — TELEPHONE ENCOUNTER
CXR is normal.  Please let me know if you have any questions.   [id number], DO 11/2/2021 8:04 AM

## 2021-11-04 RX ORDER — OXYCODONE HYDROCHLORIDE 15 MG/1
15 TABLET ORAL EVERY 6 HOURS PRN
Qty: 60 TABLET | Refills: 0 | Status: SHIPPED | OUTPATIENT
Start: 2021-11-04 | End: 2021-11-08

## 2021-11-04 NOTE — TELEPHONE ENCOUNTER
Sent in- can we confirm dosing on metformin. She has 1000mg tabs. She needs 500mg tabs BID due to renal function. Thanks.   Linh Montero, DO

## 2021-11-04 NOTE — TELEPHONE ENCOUNTER
Discussed kidney troubles and dosage of medication also discussed old medical records and what was received and what was needed.

## 2021-11-05 ENCOUNTER — TELEPHONE (OUTPATIENT)
Dept: FAMILY MEDICINE CLINIC | Facility: CLINIC | Age: 61
End: 2021-11-05

## 2021-11-05 DIAGNOSIS — G89.21 CHRONIC PAIN DUE TO TRAUMA: ICD-10-CM

## 2021-11-05 NOTE — TELEPHONE ENCOUNTER
[facility] so I cancelled the script with them patient is asking that we resend the script to [facility] in [place] pended order

## 2021-11-05 NOTE — TELEPHONE ENCOUNTER
Pt calling to inform [facility] was not able to fill #120 of oxycodone, only filled #60. Pt is asking to have remaining #60 sent to [facility] in [place] on [address].

## 2021-11-08 ENCOUNTER — TELEPHONE (OUTPATIENT)
Dept: FAMILY MEDICINE CLINIC | Facility: CLINIC | Age: 61
End: 2021-11-08

## 2021-11-08 RX ORDER — OXYCODONE HYDROCHLORIDE 15 MG/1
15 TABLET ORAL EVERY 6 HOURS PRN
Qty: 60 TABLET | Refills: 0 | Status: SHIPPED | OUTPATIENT
Start: 2021-11-08 | End: 2021-11-29

## 2021-11-08 NOTE — TELEPHONE ENCOUNTER
Pt received test results on her heart and Concerned now with her heart, the main chamber isn't filling up it only goes up half way. Is there a medication for this? If not she is asking what to do and watch for.

## 2021-11-08 NOTE — TELEPHONE ENCOUNTER
Received fax from [employer], covermymeds, prior authorization required for Oxycodone HCI 15 MG Tablets    Key # [id number]    Called patient and left message on machine explaining process, fax placed in Pat's in box

## 2021-11-08 NOTE — TELEPHONE ENCOUNTER
Pt states that she already picked up her med today and paid out of pocket.  Requesting we send in refill next month 12/2/21 for 7 days and then 1 mo as Insurance should cover

## 2021-11-08 NOTE — TELEPHONE ENCOUNTER
Notified patient medication at [facility]. She verbalized understanding.

## 2021-11-08 NOTE — TELEPHONE ENCOUNTER
Called and talked to patient Dr Greg Muhammad went over her Echocardiogram and she understood that her heart was only filling up \"jail\" and she is concerned about this and wants to know if there is a medication that would help or what should she do to get th

## 2021-11-08 NOTE — TELEPHONE ENCOUNTER
We discussed diastolic dysfunction where it does not relax completely. There is nothing to be done other than optimizing heart health (reduce/quit smoking, maintain healthy blood pressure).     Thanks,  Quita Moreira, DO

## 2021-11-09 ENCOUNTER — TELEPHONE (OUTPATIENT)
Dept: FAMILY MEDICINE CLINIC | Facility: CLINIC | Age: 61
End: 2021-11-09

## 2021-11-09 NOTE — TELEPHONE ENCOUNTER
PFTs are essentially normal, although may have some diffusion defect meaning that the oxygen does not get into her bloodstream as well as it should. No demonstrable COPD. Please let me know if you have any questions.   Elizabeth Pérez DO 11/9/2021 8:15 A

## 2021-11-10 NOTE — TELEPHONE ENCOUNTER
Pt states was   Advised by pharmacy and insurance that in order for insurance to cover medication that next refill will have to be written as 1 script for 7 day - Oxycodone 15mg #28 then write next script for month dose #120.   I told Pt about [facility] and m

## 2021-11-29 DIAGNOSIS — G89.21 CHRONIC PAIN DUE TO TRAUMA: ICD-10-CM

## 2021-11-29 RX ORDER — OXYCODONE HYDROCHLORIDE 15 MG/1
15 TABLET ORAL EVERY 6 HOURS PRN
Qty: 120 TABLET | Refills: 0 | Status: SHIPPED | OUTPATIENT
Start: 2021-11-29 | End: 2021-11-30

## 2021-11-29 NOTE — TELEPHONE ENCOUNTER
[facility] not [facility] and the script needs to go to     Ousmane Cuellar [address] [place] [address], [address] [place] [address], [phone], [phone]    Please resend.

## 2021-11-29 NOTE — TELEPHONE ENCOUNTER
Patient picked up her meds on the 2nd of nov she is asking that it be refilled    oxyCODONE 15 MG Oral Tab   She wants it sent to [facility] in  [place]   [phone] phone no abdominal pain, no bloating, no constipation, no diarrhea, no nausea and no vomiting.

## 2021-11-30 RX ORDER — OXYCODONE HYDROCHLORIDE 15 MG/1
15 TABLET ORAL EVERY 6 HOURS PRN
Qty: 120 TABLET | Refills: 0 | Status: SHIPPED | OUTPATIENT
Start: 2021-11-30 | End: 2021-12-29

## 2021-11-30 NOTE — TELEPHONE ENCOUNTER
PATIENT WOULD LIKE A CALL BACK FROM THE TRIAGE NURSE SHE IS STILL WAITING ON THE MEDICATION TO GO TO THE [facility]  [address]

## 2021-12-08 ENCOUNTER — TELEPHONE (OUTPATIENT)
Dept: FAMILY MEDICINE CLINIC | Facility: CLINIC | Age: 61
End: 2021-12-08

## 2021-12-08 LAB
AMB EXT GLUCOSE: 108 MG/DL (ref 70–99)
AMB EXT HGBA1C: 6.1 % (ref 4.6–5.6)
AMB EXT SODIUM: 140 MMOL/L

## 2021-12-09 NOTE — TELEPHONE ENCOUNTER
Creatinine improved at 1.4, A1c improved at 6.1%. No change in meds. Ready for abstract. Please let me know if you have any questions.   Leah Wen, DO 12/9/2021 5:15 PM

## 2021-12-10 NOTE — TELEPHONE ENCOUNTER
patient called back went over labs with her. She asked if Dr Laurent Field was going to find her help in maintaining her house as she cannot use one arm and has trouble cleaning up the house.

## 2021-12-11 NOTE — TELEPHONE ENCOUNTER
I think we need to get her involved ith one of our chronic care managers. Hopefully they cna help her find an in home aid to help. Can we refer her for CCM?     Thanks,  Tanvir Garay, DO

## 2021-12-16 DIAGNOSIS — F41.9 ANXIETY: ICD-10-CM

## 2021-12-16 DIAGNOSIS — F43.12 CHRONIC POST-TRAUMATIC STRESS DISORDER (PTSD): ICD-10-CM

## 2021-12-16 RX ORDER — MIRTAZAPINE 30 MG/1
30 TABLET, FILM COATED ORAL NIGHTLY
Qty: 90 TABLET | Refills: 1 | Status: SHIPPED | OUTPATIENT
Start: 2021-12-16

## 2021-12-16 NOTE — TELEPHONE ENCOUNTER
Pt is calling requesting a refill on mirtazapine 30 MG Oral Tab. Pt stated she only has 5 tablets left and there's no more refills wants to make sure this gets taken care of.    Preferred Pharmacy: [address] [facility]

## 2021-12-26 DIAGNOSIS — J44.9 CHRONIC OBSTRUCTIVE PULMONARY DISEASE, UNSPECIFIED COPD TYPE (HCC): ICD-10-CM

## 2021-12-26 DIAGNOSIS — R06.00 DYSPNEA ON EXERTION: ICD-10-CM

## 2021-12-27 DIAGNOSIS — G89.21 CHRONIC PAIN DUE TO TRAUMA: ICD-10-CM

## 2021-12-27 NOTE — TELEPHONE ENCOUNTER
Rizatriptan Benzoate 10 MG Oral Tab          Sig: Take 1 tablet (10 mg total) by mouth as needed for Migraine (repeat in 2 hours if not better MAX 2 in 24 hours. ).     Disp:  12 tablet    Refills:  1    Start: 12/27/2021    Class: Normal    Last ordered: 6

## 2021-12-27 NOTE — TELEPHONE ENCOUNTER
Pt requesting refills for the following medications to be sent to [facility]:  furosemide (LASIX) 20 MG Oral Tab  Rizatriptan Benzoate 10 MG Oral Tab    Pt also requesting a refill for oxyCODONE 15 MG Oral Tab be sent to [facility].   Pt uses [facility] for this m

## 2021-12-28 RX ORDER — FUROSEMIDE 20 MG/1
TABLET ORAL
Qty: 30 TABLET | Refills: 0 | Status: SHIPPED | OUTPATIENT
Start: 2021-12-28 | End: 2022-02-03

## 2021-12-28 NOTE — TELEPHONE ENCOUNTER
Requested Prescriptions     Pending Prescriptions Disp Refills   • FUROSEMIDE 20 MG Oral Tab [Pharmacy Med Name: Furosemide 20 MG Oral Tablet] 90 tablet 0     Sig: Take 1 tablet by mouth once daily     LOV 11/2/2021   No labs completed on file    Patient w

## 2021-12-29 RX ORDER — RIZATRIPTAN BENZOATE 10 MG/1
10 TABLET ORAL AS NEEDED
Qty: 12 TABLET | Refills: 1 | Status: SHIPPED | OUTPATIENT
Start: 2021-12-29

## 2021-12-29 RX ORDER — OXYCODONE HYDROCHLORIDE 15 MG/1
15 TABLET ORAL EVERY 6 HOURS PRN
Qty: 120 TABLET | Refills: 0 | Status: SHIPPED | OUTPATIENT
Start: 2021-12-29 | End: 2022-01-27

## 2022-01-14 ENCOUNTER — TELEPHONE (OUTPATIENT)
Dept: FAMILY MEDICINE CLINIC | Facility: CLINIC | Age: 62
End: 2022-01-14

## 2022-01-14 NOTE — TELEPHONE ENCOUNTER
Received a call from [facility] in [place] [address] stating patient is calling looking for pain control management from them. [facility] does not handle this type of issues. Please call patient and explain. If patient need pain management Dr Lucila Clark can be notified and an appropriate referral can be placed.    Thank you

## 2022-01-17 NOTE — TELEPHONE ENCOUNTER
Triage please explain to patient that CCM will assist with social type issues but not with pain care, Pt will need to see pain specialist for this

## 2022-01-17 NOTE — TELEPHONE ENCOUNTER
Can we explain to CCM team that she was referred to pain management and they are awaiting records. I have called and tried to get records and get no response. She needs to see pain management. IF we can even get her into our clinic, that may be a start. If they could help coordinate this, that would be great.    She also has many care needs in the home and with appointments, etc.    [address], DO

## 2022-01-18 RX ORDER — ALPRAZOLAM 1 MG/1
1 TABLET ORAL 3 TIMES DAILY PRN
Qty: 90 TABLET | Refills: 2 | Status: SHIPPED | OUTPATIENT
Start: 2022-01-18 | End: 2022-02-14

## 2022-01-18 NOTE — TELEPHONE ENCOUNTER
Phone number to pain clinic given to patient [phone]. Routed to Dr. Ines Diamond I reached out to [facility] dept yesterday and received the following response:     Unfortunately, this patient doesn't qualify for CCM due to her not having a product of Medicare.

## 2022-01-18 NOTE — TELEPHONE ENCOUNTER
Yes, I will refer to our local pain management. Can we provide her with their number? As for CCM, I believe I tried referring given she needs to be set up with a in home aide. She had previously had this through [facility] in [place] but has not had established out here yet. Any way we can refer to them for this?     Thanks,  Dinorah Found, DO

## 2022-01-19 ENCOUNTER — TELEPHONE (OUTPATIENT)
Dept: FAMILY MEDICINE CLINIC | Facility: CLINIC | Age: 62
End: 2022-01-19

## 2022-01-19 DIAGNOSIS — E11.21 TYPE 2 DIABETES MELLITUS WITH DIABETIC NEPHROPATHY, WITHOUT LONG-TERM CURRENT USE OF INSULIN (HCC): Primary | ICD-10-CM

## 2022-01-19 NOTE — TELEPHONE ENCOUNTER
Pt is calling for her Metformin to be refilled to [address] [place] [address] in [place]. She only has a few days left.

## 2022-01-25 ENCOUNTER — TELEPHONE (OUTPATIENT)
Dept: FAMILY MEDICINE CLINIC | Facility: CLINIC | Age: 62
End: 2022-01-25

## 2022-01-25 DIAGNOSIS — G47.01 INSOMNIA DUE TO MEDICAL CONDITION: ICD-10-CM

## 2022-01-25 DIAGNOSIS — G89.21 CHRONIC PAIN DUE TO TRAUMA: ICD-10-CM

## 2022-01-25 NOTE — TELEPHONE ENCOUNTER
Pt is calling will be out of medication on 1/27/2022 ( no refills left). Pt last filled medication on 12/28/2021. Pt is scheduled with Dr. Jaquelin Eric 2/1 wants to make sure she gets a refill prior to visit.  She also has a visit scheduled with pain specialist 2

## 2022-01-25 NOTE — TELEPHONE ENCOUNTER
Patient has a appointment on 02/01 @11:40 with Dr Carter Sites she needs her oxyCODONE 15 MG Oral Tab ASAP refilled .

## 2022-01-27 RX ORDER — OXYCODONE HYDROCHLORIDE 15 MG/1
15 TABLET ORAL EVERY 6 HOURS PRN
Qty: 120 TABLET | Refills: 0 | Status: SHIPPED | OUTPATIENT
Start: 2022-01-27 | End: 2022-02-26

## 2022-01-27 RX ORDER — ZOLPIDEM TARTRATE 12.5 MG/1
12.5 TABLET, FILM COATED, EXTENDED RELEASE ORAL NIGHTLY PRN
Qty: 30 TABLET | Refills: 2 | Status: SHIPPED | OUTPATIENT
Start: 2022-01-27

## 2022-02-01 ENCOUNTER — OFFICE VISIT (OUTPATIENT)
Dept: FAMILY MEDICINE CLINIC | Facility: CLINIC | Age: 62
End: 2022-02-01
Payer: MEDICAID

## 2022-02-01 VITALS
WEIGHT: 160.19 LBS | HEART RATE: 80 BPM | RESPIRATION RATE: 16 BRPM | TEMPERATURE: 97 F | HEIGHT: 65.5 IN | BODY MASS INDEX: 26.37 KG/M2 | DIASTOLIC BLOOD PRESSURE: 64 MMHG | SYSTOLIC BLOOD PRESSURE: 104 MMHG

## 2022-02-01 DIAGNOSIS — F13.20 SEDATIVE, HYPNOTIC OR ANXIOLYTIC DEPENDENCE, UNCOMPLICATED (HCC): ICD-10-CM

## 2022-02-01 DIAGNOSIS — J44.9 CHRONIC OBSTRUCTIVE PULMONARY DISEASE, UNSPECIFIED COPD TYPE (HCC): ICD-10-CM

## 2022-02-01 DIAGNOSIS — E78.2 MIXED HYPERLIPIDEMIA: ICD-10-CM

## 2022-02-01 DIAGNOSIS — N18.32 STAGE 3B CHRONIC KIDNEY DISEASE (HCC): ICD-10-CM

## 2022-02-01 DIAGNOSIS — G43.709 CHRONIC MIGRAINE WITHOUT AURA WITHOUT STATUS MIGRAINOSUS, NOT INTRACTABLE: ICD-10-CM

## 2022-02-01 DIAGNOSIS — G47.01 INSOMNIA DUE TO MEDICAL CONDITION: ICD-10-CM

## 2022-02-01 DIAGNOSIS — F11.20 OPIOID DEPENDENCE, UNCOMPLICATED (HCC): ICD-10-CM

## 2022-02-01 DIAGNOSIS — E11.21 TYPE 2 DIABETES MELLITUS WITH DIABETIC NEPHROPATHY, WITHOUT LONG-TERM CURRENT USE OF INSULIN (HCC): ICD-10-CM

## 2022-02-01 DIAGNOSIS — G89.21 CHRONIC PAIN DUE TO TRAUMA: ICD-10-CM

## 2022-02-01 DIAGNOSIS — L03.213 PRESEPTAL CELLULITIS OF LEFT EYE: Primary | ICD-10-CM

## 2022-02-01 PROCEDURE — 3078F DIAST BP <80 MM HG: CPT | Performed by: FAMILY MEDICINE

## 2022-02-01 PROCEDURE — 3008F BODY MASS INDEX DOCD: CPT | Performed by: FAMILY MEDICINE

## 2022-02-01 PROCEDURE — 99214 OFFICE O/P EST MOD 30 MIN: CPT | Performed by: FAMILY MEDICINE

## 2022-02-01 PROCEDURE — 3074F SYST BP LT 130 MM HG: CPT | Performed by: FAMILY MEDICINE

## 2022-02-01 RX ORDER — PROMETHAZINE HYDROCHLORIDE 25 MG/1
25 TABLET ORAL EVERY 6 HOURS PRN
Qty: 30 TABLET | Refills: 2 | Status: SHIPPED | OUTPATIENT
Start: 2022-02-01

## 2022-02-01 RX ORDER — SULFAMETHOXAZOLE AND TRIMETHOPRIM 800; 160 MG/1; MG/1
1 TABLET ORAL 2 TIMES DAILY
Qty: 28 TABLET | Refills: 0 | Status: SHIPPED | OUTPATIENT
Start: 2022-02-01

## 2022-02-03 RX ORDER — FUROSEMIDE 20 MG/1
20 TABLET ORAL DAILY
Qty: 90 TABLET | Refills: 1 | Status: SHIPPED | OUTPATIENT
Start: 2022-02-03

## 2022-02-03 NOTE — TELEPHONE ENCOUNTER
Pt is calling and she needs her Furosenude 20 mg refilled to [facility] no refills left. LOV 2/1/22 with Dr. Samuel Preciado she forgot to ask her to refill it at appt.  She only has two days left

## 2022-02-14 ENCOUNTER — TELEPHONE (OUTPATIENT)
Dept: FAMILY MEDICINE CLINIC | Facility: CLINIC | Age: 62
End: 2022-02-14

## 2022-02-14 NOTE — TELEPHONE ENCOUNTER
Is a reason she needs refills on her alprazolam because she is switching pharmacies. Because she should have to refills according to what I sent in. If so, can we please call in her alprazolam No. 90 tabs with 1 refill to the new pharmacy and cancel at her previous pharmacy. Please make sure we get copies of neurology and cardiology consult. Please let me know if you have any questions.   Catrachito Ya, DO 2/14/2022 4:32 PM

## 2022-02-14 NOTE — TELEPHONE ENCOUNTER
Pt fainted in elevated at her residence and was taken to [facility] in [place] on 2/9 was kept overnight and was advised to do a follow up visit with Dr Haylee Llanes, patient states unable to make an appointment and would like to speak to a nurse as she. Patient states was informed the fainting was due to medication.

## 2022-02-14 NOTE — TELEPHONE ENCOUNTER
Call made to discuss. Patient was admitted to [facility] on 2/9-2/10. Was told she was dehydrated and it was due to her fenofibrate and furosemide. She has stopped fenofibrate and told to use furosemide as needed for swelling. Needs to see neuro and cardio now. Has upcoming appointments. Patient states she was unaware she had high blood pressure. Unable to follow up with Dr. Duncan Whalen this month due to other appointments. Unable to do virtual visit. She would use her sister's phone for this and she recently had a stroke. Patient does not want to find a PCP in [facility] and wants to stay with Dr. Duncan Whalen. Patient requesting Alprazolam refill. Has 2 left. Needs to go to [facility] now [facility] in [place] [address]. Last refilled     ALPRAZolam 1 MG Oral Tab 90 tablet 2 1/18/2022     Sig - Route:  Take 1 tablet (1 mg total) by mouth 3 (three) times daily as needed for Anxiety. - Oral    Sent to pharmacy as: ALPRAZolam 1 MG Oral Tablet Regine Fill)

## 2022-02-14 NOTE — TELEPHONE ENCOUNTER
Patient is using new pharmacy. She states the neuro she was referred to will not see her because she does not have a PCP in [place]. Patient does not want a new PCP but does not want to see neuro here due to travelling. Advised patient will have to determine which she would like to do. Alprazolam cancelled at [facility] and called into [facility] now. Med pending for provider to sign off of.

## 2022-02-15 RX ORDER — ALPRAZOLAM 1 MG/1
1 TABLET ORAL 3 TIMES DAILY PRN
Qty: 90 TABLET | Refills: 1 | OUTPATIENT
Start: 2022-02-15

## 2022-02-23 ENCOUNTER — TELEPHONE (OUTPATIENT)
Dept: FAMILY MEDICINE CLINIC | Facility: CLINIC | Age: 62
End: 2022-02-23

## 2022-02-23 NOTE — TELEPHONE ENCOUNTER
Pt had an appt with Pain management to manage her pain and take over her Oxycodone 15 mg but they cancelled her appt and rescheduled to 3/8/22 she will be out of medication again before the appt. Can we refill again to [facility]?  She will be out of her Zolpidem Tartrate ER 12.5 mg.

## 2022-02-23 NOTE — TELEPHONE ENCOUNTER
Pt calling to cancel refill request. States she found a provider closer to home. She will now get her medication from him.  He will also be referring her to a pain specialist.

## 2022-02-24 ENCOUNTER — TELEPHONE (OUTPATIENT)
Dept: FAMILY MEDICINE CLINIC | Facility: CLINIC | Age: 62
End: 2022-02-24

## 2022-02-24 NOTE — TELEPHONE ENCOUNTER
Received fax from [employer], prior authorization required for Alprazolam 1 MG Tablets    Key # [id number]    Called patient and left message on machine explaining process, fax placed in Pat's in box

## 2022-03-15 LAB
AMB EXT BILIRUBIN, TOTAL: 0.4 MG/DL (ref 0.2–1.3)
AMB EXT CALCIUM: 9.9 (ref 8.5–10.1)
AMB EXT CHLORIDE: 105 (ref 96–110)
AMB EXT CHOLESTEROL, TOTAL: 156 MG/DL (ref 110–200)
AMB EXT CMP ALT: 20 U/L (ref 0–34)
AMB EXT CMP AST: 33 U/L (ref 14–36)
AMB EXT HDL CHOLESTEROL: 49 MG/DL (ref 40–?)
AMB EXT HEMATOCRIT: 44 (ref 37–47)
AMB EXT HEMOGLOBIN: 14.9 (ref 12–16)
AMB EXT LDL CHOLESTEROL, DIRECT: 80 MG/DL (ref 0–99)
AMB EXT MCV: 96.5 (ref 80–99)
AMB EXT PLATELETS: 278 (ref 130–400)
AMB EXT POSTASSIUM: 4.2 MMOL/L (ref 3.5–5)
AMB EXT SODIUM: 140 MMOL/L (ref 135–146)
AMB EXT TOTAL PROTEIN: 7.8 (ref 6.4–8.2)
AMB EXT TSH: 0.83 MIU/ML (ref 0.47–4.68)
AMB EXT WBC: 10.3 X10(3)UL (ref 4.5–11)
CREATININE, RANDOM URINE: 62.3 (ref 13–900)
MICROALBUMIN, URINE: <6
MICROALBUMIN/CREATININE RATIO, RANDOM URINE: 9.6 (ref 0–29.9)
T4, FREE: 1.04 (ref 0.78–2.19)

## 2022-04-19 ENCOUNTER — TELEPHONE (OUTPATIENT)
Dept: FAMILY MEDICINE CLINIC | Facility: CLINIC | Age: 62
End: 2022-04-19

## 2022-04-19 NOTE — TELEPHONE ENCOUNTER
Received medical records request from patient requesting all medical records for continuation of care.  All records printed and faxed to Elaina Pringle [phone]

## 2022-04-20 ENCOUNTER — PATIENT OUTREACH (OUTPATIENT)
Dept: FAMILY MEDICINE CLINIC | Facility: CLINIC | Age: 62
End: 2022-04-20

## 2022-05-19 ENCOUNTER — PATIENT OUTREACH (OUTPATIENT)
Dept: FAMILY MEDICINE CLINIC | Facility: CLINIC | Age: 62
End: 2022-05-19

## 2023-03-28 NOTE — TELEPHONE ENCOUNTER
Patient called back she will start taking metformin BID and use up her current script before filling the new one.  She also said the pharmacy had only 60 Oxycodone and will have more in 2 days so Dr Maria Eugenia Ndiaye will need to send in a new prescription for 60 Oxyc Admission

## 2023-04-26 ENCOUNTER — TELEPHONE (OUTPATIENT)
Dept: FAMILY MEDICINE CLINIC | Facility: CLINIC | Age: 63
End: 2023-04-26

## 2023-04-26 NOTE — TELEPHONE ENCOUNTER
Please remove Dr. Alysa Herron as PCP and patient from all care gaps (diabetes).  She is now seeing internal medicine doctor (Dr. Divya Ellis)    Thank you,  Erasto Montoya

## 2024-04-16 NOTE — TELEPHONE ENCOUNTER
Pt has not been successful in getting in with pain management. She is waiting till this last one reviews all maria [facility] records to determine if they will take her case. Will you refill her Oxycodone? General Sunscreen Counseling: I recommended a broad spectrum sunscreen with a SPF of 30 or higher.  I explained that SPF 30 sunscreens block approximately 97 percent of the sun's harmful rays.  Sunscreens should be applied at least 15 minutes prior to expected sun exposure and then every 2 hours after that as long as sun exposure continues. If swimming or exercising sunscreen should be reapplied every 45 minutes to an hour after getting wet or sweating.  One ounce, or the equivalent of a shot glass full of sunscreen, is adequate to protect the skin not covered by a bathing suit. I also recommended a lip balm with a sunscreen as well. Sun protective clothing can be used in lieu of sunscreen but must be worn the entire time you are exposed to the sun's rays. Detail Level: Detailed

## (undated) DIAGNOSIS — F11.20 OPIOID DEPENDENCE, UNCOMPLICATED (HCC): ICD-10-CM

## (undated) DIAGNOSIS — G89.21 CHRONIC PAIN DUE TO TRAUMA: Primary | ICD-10-CM

## (undated) DIAGNOSIS — F41.9 ANXIETY: ICD-10-CM

## (undated) DIAGNOSIS — R06.00 DYSPNEA ON EXERTION: ICD-10-CM

## (undated) DIAGNOSIS — J44.9 CHRONIC OBSTRUCTIVE PULMONARY DISEASE, UNSPECIFIED COPD TYPE (HCC): ICD-10-CM

## (undated) DIAGNOSIS — Z12.31 SCREENING MAMMOGRAM FOR BREAST CANCER: Primary | ICD-10-CM